# Patient Record
Sex: FEMALE | Race: WHITE | Employment: OTHER | URBAN - METROPOLITAN AREA
[De-identification: names, ages, dates, MRNs, and addresses within clinical notes are randomized per-mention and may not be internally consistent; named-entity substitution may affect disease eponyms.]

---

## 2018-02-01 ENCOUNTER — OFFICE VISIT (OUTPATIENT)
Dept: ORTHOPEDIC SURGERY | Age: 78
End: 2018-02-01

## 2018-02-01 VITALS
DIASTOLIC BLOOD PRESSURE: 79 MMHG | SYSTOLIC BLOOD PRESSURE: 125 MMHG | WEIGHT: 150 LBS | BODY MASS INDEX: 26.58 KG/M2 | HEIGHT: 63 IN

## 2018-02-01 DIAGNOSIS — M51.36 DDD (DEGENERATIVE DISC DISEASE), LUMBAR: Primary | ICD-10-CM

## 2018-02-01 DIAGNOSIS — M54.50 PAIN OF LUMBAR SPINE: ICD-10-CM

## 2018-02-01 DIAGNOSIS — M54.16 LUMBAR RADICULOPATHY: ICD-10-CM

## 2018-02-01 DIAGNOSIS — M47.816 SPONDYLOSIS OF LUMBAR REGION WITHOUT MYELOPATHY OR RADICULOPATHY: ICD-10-CM

## 2018-02-01 PROCEDURE — G8400 PT W/DXA NO RESULTS DOC: HCPCS | Performed by: PHYSICAL MEDICINE & REHABILITATION

## 2018-02-01 PROCEDURE — 1036F TOBACCO NON-USER: CPT | Performed by: PHYSICAL MEDICINE & REHABILITATION

## 2018-02-01 PROCEDURE — 99203 OFFICE O/P NEW LOW 30 MIN: CPT | Performed by: PHYSICAL MEDICINE & REHABILITATION

## 2018-02-01 PROCEDURE — 1123F ACP DISCUSS/DSCN MKR DOCD: CPT | Performed by: PHYSICAL MEDICINE & REHABILITATION

## 2018-02-01 PROCEDURE — 1090F PRES/ABSN URINE INCON ASSESS: CPT | Performed by: PHYSICAL MEDICINE & REHABILITATION

## 2018-02-01 PROCEDURE — 4040F PNEUMOC VAC/ADMIN/RCVD: CPT | Performed by: PHYSICAL MEDICINE & REHABILITATION

## 2018-02-01 PROCEDURE — G8484 FLU IMMUNIZE NO ADMIN: HCPCS | Performed by: PHYSICAL MEDICINE & REHABILITATION

## 2018-02-01 PROCEDURE — G8427 DOCREV CUR MEDS BY ELIG CLIN: HCPCS | Performed by: PHYSICAL MEDICINE & REHABILITATION

## 2018-02-01 PROCEDURE — G8419 CALC BMI OUT NRM PARAM NOF/U: HCPCS | Performed by: PHYSICAL MEDICINE & REHABILITATION

## 2018-02-01 NOTE — PROGRESS NOTES
115-21 MCG/ACT inhaler, Inhale 2 puffs into the lungs 2 times daily Indications: 500/50MCG , Disp: , Rfl:     albuterol (PROVENTIL HFA;VENTOLIN HFA) 108 (90 BASE) MCG/ACT inhaler, Inhale 2 puffs into the lungs every 6 hours as needed. , Disp: , Rfl:     metaxalone (SKELAXIN) 800 MG tablet, Take 800 mg by mouth 3 times daily as needed. , Disp: , Rfl:     oxcarbazepine (TRILEPTAL) 150 MG tablet, Take 150 mg by mouth. 450mg pm 150mg am, Disp: , Rfl:     fluticasone (FLONASE) 50 MCG/ACT nasal spray, 1 spray by Nasal route daily. , Disp: , Rfl:     therapeutic multivitamin-minerals (THERAGRAN-M) tablet, Take 1 tablet by mouth daily. , Disp: , Rfl:   Allergies:  Cephalexin; Lyrica [pregabalin]; Penicillins; Singulair [montelukast sodium]; and Sulfa antibiotics  Social History:    reports that she has never smoked. She has never used smokeless tobacco. She reports that she drinks alcohol. She reports that she does not use drugs. Family History:   History reviewed. No pertinent family history. REVIEW OF SYSTEMS: Full ROS noted & scanned          PHYSICAL EXAM:    Vitals: Blood pressure 125/79, height 5' 2.5\" (1.588 m), weight 150 lb (68 kg), not currently breastfeeding. GENERAL EXAM:  · General Apparence: Patient is adequately groomed with no evidence of malnutrition. · Psychiatric: Orientation: The patient is oriented to time, place and person. The patient's mood and affect are appropriate   · Vascular: Examination reveals no swelling and palpation reveals no tenderness in upper or lower extremities. Good capillary refill. Sensation is intact without deficit in the lower extremities to light touch and pinprick  · Coordination of the lower extremities are normal.    LUMBAR/SACRAL EXAMINATION:  · Inspection: Local inspection shows no step-off or bruising. Lumbar alignment is normal. No instability is noted. · Palpation:   No evidence of tenderness at the midline.   No tenderness bilaterally at the

## 2018-02-13 ENCOUNTER — HOSPITAL ENCOUNTER (OUTPATIENT)
Dept: PHYSICAL THERAPY | Age: 78
Discharge: HOME | End: 2018-02-28
Admitting: PHYSICAL MEDICINE & REHABILITATION

## 2018-02-22 ENCOUNTER — HOSPITAL ENCOUNTER (OUTPATIENT)
Dept: PHYSICAL THERAPY | Age: 78
Discharge: HOME | End: 2018-02-23
Admitting: PHYSICAL MEDICINE & REHABILITATION

## 2018-02-22 NOTE — FLOWSHEET NOTE
[x] EVAL (LOW) 01100 (typically 20 minutes face-to-face)  [] EVAL (MOD) 06649 (typically 30 minutes face-to-face)  [] EVAL (HIGH) 55979 (typically 45 minutes face-to-face)  [] RE-EVAL     [x] UV(00181) x      [] IONTO  [] NMR (57262) x      [] VASO  [] Manual (18615) x       [] Other:  [] TA x       [] Mech Traction (37722)  [] ES(attended) (84049)      [] ES (un) (87786):     Goals:   Short Term Goals: To be achieved in: 2 weeks  1. Independent in HEP and progression per patient tolerance, in order to prevent re-injury. 2. Patient will have a decrease in pain to facilitate improvement in movement, function, and ADLs as indicated by Functional Deficits. Long Term Goals: To be achieved in: 5 weeks  1. Disability index score of 20% or less for the RENA to assist with reaching prior level of function. 2. Patient will demonstrate increased AROM to WNL, good LS mobility, good hip ROM to allow for proper joint functioning as indicated by patients Functional Deficits. 3. Patient will demonstrate an increase in Strength to good proximal hip and core activation to allow for proper functional mobility as indicated by patients Functional Deficits. 4. Patient will return to walking for 15 minutes, functional activities without increased symptoms or restriction. Progression Towards Functional goals:  [] Patient is progressing as expected towards functional goals listed. [] Progression is slowed due to complexities listed. [] Progression has been slowed due to co-morbidities.   [x] Plan just implemented, too soon to assess goals progression  [] Other:     ASSESSMENT:  See eval    Treatment/Activity Tolerance:  [x] Patient tolerated treatment well [] Patient limited by fatique  [] Patient limited by pain  [] Patient limited by other medical complications  [] Other:     Prognosis: [] Good [x] Fair  [] Poor    Patient Requires Follow-up: [x] Yes  [] No    PLAN FOR NEXT SESSION:     PLAN: See eval  [] Continue

## 2018-02-22 NOTE — PLAN OF CARE
restricted    ASSESSMENT:   Functional Impairments:     [x]Noted lumbar/proximal hip hypomobility   []Noted lumbosacral and/or generalized hypermobility   [x]Decreased Lumbosacral/hip/LE functional ROM   [x]Decreased core/proximal hip strength and neuromuscular control    [x]Decreased LE functional strength    [x]Abnormal reflexes/sensation/myotomal/dermatomal deficits  []Reduced balance/proprioceptive control    []other:      Functional Activity Limitations (from functional questionnaire and intake)   [x]Reduced ability to tolerate prolonged functional positions   []Reduced ability or difficulty with changes of positions or transfers between positions   [x]Reduced ability to maintain good posture and demonstrate good body mechanics with sitting, bending, and lifting   [x]Reduced ability to sleep   [] Reduced ability or tolerance with driving and/or computer work   [x]Reduced ability to perform lifting, reaching, carrying tasks   [x]Reduced ability to squat   [x]Reduced ability to forward bend   [x]Reduced ability to ambulate prolonged functional periods/distances/surfaces   [x]Reduced ability to ascend/descend stairs   []other:       Participation Restrictions   [x]Reduced participation in self care activities   [x]Reduced participation in home management activities   []Reduced participation in work activities   [x]Reduced participation in social activities. []Reduced participation in sport/recreation activities. Classification:   []Signs/symptoms consistent with Lumbar instability/stabilization subgroup. []Signs/symptoms consistent with Lumbar mobilization/manipulation subgroup, myotomes and dermatomes intact. Meets manipulation criteria.     []Signs/symptoms consistent with Lumbar direction specific/centralization subgroup   []Signs/symptoms consistent with Lumbar traction subgroup     []Signs/symptoms consistent with lumbar facet dysfunction   [x]Signs/symptoms consistent with lumbar stenosis type proprioception for the proximal upper extremity and deep neck flexors. 1 Therapeutic exercise including: strength training, ROM/flexibility, NMR and proprioception for the core, hips and bilateral lower extremities. 2. Manual therapy as indicated including Dry Needling/IASTM, STM, PROM, Gr I-IV mobilizations, spinal mobilization/manipulation. 3. Modalities as needed including: thermal agents, E-stim, US, iontophoresis as indicated. 4. Patient education on spine protection, activity modification, progression of HEP. HEP instruction: Can be found in media file. (see scanned forms)    GOALS:  Patient stated goal: walk without pain      Therapist goals for Patient:Lumbar   Short Term Goals: To be achieved in: 2 weeks  1. Independent in HEP and progression per patient tolerance, in order to prevent re-injury. 2. Patient will have a decrease in pain to facilitate improvement in movement, function, and ADLs as indicated by Functional Deficits. Long Term Goals: To be achieved in: 5 weeks  1. Disability index score of 20% or less for the RENA to assist with reaching prior level of function. 2. Patient will demonstrate increased AROM to WNL, good LS mobility, good hip ROM to allow for proper joint functioning as indicated by patients Functional Deficits. 3. Patient will demonstrate an increase in Strength to good proximal hip and core activation to allow for proper functional mobility as indicated by patients Functional Deficits. 4. Patient will return to walking for 15 minutes, functional activities without increased symptoms or restriction.            Electronically signed by:  Haven Toure, [id number] [facility]

## 2018-03-01 ENCOUNTER — HOSPITAL ENCOUNTER (OUTPATIENT)
Dept: PHYSICAL THERAPY | Age: 78
Discharge: HOME | End: 2018-03-22
Attending: PHYSICAL MEDICINE & REHABILITATION | Admitting: PHYSICAL MEDICINE & REHABILITATION

## 2018-03-02 ENCOUNTER — HOSPITAL ENCOUNTER (OUTPATIENT)
Dept: PHYSICAL THERAPY | Age: 78
Discharge: HOME | End: 2018-03-03
Admitting: PHYSICAL MEDICINE & REHABILITATION

## 2018-03-02 NOTE — FLOWSHEET NOTE
Quadruped UE      Quadruped LE      Quadruped UE/LE combined (birddog)       TA / Sharlon  / Abd Hollow 15x5''     TA March      Bridge 15x3''     SLR Flex      SLR Abd 15x B     Clamshells      Prone plank      Side plank      Squats      Standing Stretch (insert muscle)                Therapeutic Exercise and NMR EXR  [x] (65144) Provided verbal/tactile cueing for activities related to strengthening, flexibility, endurance, ROM  for improvements in proximal hip and core control with self care, mobility, lifting and ambulation.  [] (36011) Provided verbal/tactile cueing for activities related to improving balance, coordination, kinesthetic sense, posture, motor skill, proprioception  to assist with core control in self care, mobility, lifting, and ambulation.      Therapeutic Activities:    [] (31981 or 63599) Provided verbal/tactile cueing for activities related to improving balance, coordination, kinesthetic sense, posture, motor skill, proprioception and motor activation to allow for proper function  with self care and ADLs  [] (90864) Provided training and instruction to the patient for proper core and proximal hip recruitment and positioning with ambulation re-education     Home Exercise Program:    [x] (19618) Reviewed/Progressed HEP activities related to strengthening, flexibility, endurance, ROM of core, proximal hip and LE for functional self-care, mobility, lifting and ambulation   [] (20917) Reviewed/Progressed HEP activities related to improving balance, coordination, kinesthetic sense, posture, motor skill, proprioception of core, proximal hip and LE for self care, mobility, lifting, and ambulation      Manual Treatments: Traction / PA's (Gr I, II, III, IV) / Stretch- Hamstring, Piriformis, Hip Flexor, Groin / STM/ Sacral Decompression  [x] Provided manual therapy to mobilize soft tissue/joints for the purpose of modulating pain, promoting relaxation, increasing ROM, reducing/eliminating soft tissue swelling/inflammation/restriction, improving soft tissue extensibility and allowing for proper ROM for normal function. (72871). Modalities:   Cp 10'    Charges:  Timed Code Treatment Minutes: 30   Total Treatment Minutes: 40     [] EVAL (LOW) 00877 (typically 20 minutes face-to-face)  [] EVAL (MOD) 79814 (typically 30 minutes face-to-face)  [] EVAL (HIGH) 13037 (typically 45 minutes face-to-face)  [] RE-EVAL     [x] LT(53365) x  1   [] IONTO  [] NMR (50100) x      [] VASO  [x] Manual (25196) x  1    [] Other:  [] TA x       [] Mech Traction (88703)  [] ES(attended) (56251)      [] ES (un) (81846):     Goals:   Short Term Goals: To be achieved in: 2 weeks  1. Independent in HEP and progression per patient tolerance, in order to prevent re-injury. 2. Patient will have a decrease in pain to facilitate improvement in movement, function, and ADLs as indicated by Functional Deficits. Long Term Goals: To be achieved in: 5 weeks  1. Disability index score of 20% or less for the RENA to assist with reaching prior level of function. 2. Patient will demonstrate increased AROM to WNL, good LS mobility, good hip ROM to allow for proper joint functioning as indicated by patients Functional Deficits. 3. Patient will demonstrate an increase in Strength to good proximal hip and core activation to allow for proper functional mobility as indicated by patients Functional Deficits. 4. Patient will return to walking for 15 minutes, functional activities without increased symptoms or restriction. Progression Towards Functional goals:  [] Patient is progressing as expected towards functional goals listed. [] Progression is slowed due to complexities listed. [] Progression has been slowed due to co-morbidities.   [x] Plan just implemented, too soon to assess goals progression  [] Other:     ASSESSMENT:  See eval    Treatment/Activity Tolerance:  [x] Patient tolerated treatment well [] Patient limited by sabiha  []

## 2018-03-05 ENCOUNTER — HOSPITAL ENCOUNTER (OUTPATIENT)
Dept: PHYSICAL THERAPY | Age: 78
Discharge: HOME | End: 2018-03-06
Admitting: PHYSICAL MEDICINE & REHABILITATION

## 2018-03-08 ENCOUNTER — HOSPITAL ENCOUNTER (OUTPATIENT)
Dept: PHYSICAL THERAPY | Age: 78
Discharge: HOME | End: 2018-03-09
Admitting: PHYSICAL MEDICINE & REHABILITATION

## 2018-03-14 ENCOUNTER — OFFICE VISIT (OUTPATIENT)
Dept: ORTHOPEDIC SURGERY | Age: 78
End: 2018-03-14

## 2018-03-14 VITALS
HEIGHT: 63 IN | DIASTOLIC BLOOD PRESSURE: 79 MMHG | BODY MASS INDEX: 26.56 KG/M2 | WEIGHT: 149.91 LBS | SYSTOLIC BLOOD PRESSURE: 125 MMHG

## 2018-03-14 DIAGNOSIS — M54.16 LUMBAR RADICULOPATHY: ICD-10-CM

## 2018-03-14 DIAGNOSIS — M51.36 DDD (DEGENERATIVE DISC DISEASE), LUMBAR: Primary | ICD-10-CM

## 2018-03-14 PROCEDURE — G8419 CALC BMI OUT NRM PARAM NOF/U: HCPCS | Performed by: PHYSICAL MEDICINE & REHABILITATION

## 2018-03-14 PROCEDURE — 4040F PNEUMOC VAC/ADMIN/RCVD: CPT | Performed by: PHYSICAL MEDICINE & REHABILITATION

## 2018-03-14 PROCEDURE — 1123F ACP DISCUSS/DSCN MKR DOCD: CPT | Performed by: PHYSICAL MEDICINE & REHABILITATION

## 2018-03-14 PROCEDURE — 1090F PRES/ABSN URINE INCON ASSESS: CPT | Performed by: PHYSICAL MEDICINE & REHABILITATION

## 2018-03-14 PROCEDURE — G8484 FLU IMMUNIZE NO ADMIN: HCPCS | Performed by: PHYSICAL MEDICINE & REHABILITATION

## 2018-03-14 PROCEDURE — G8400 PT W/DXA NO RESULTS DOC: HCPCS | Performed by: PHYSICAL MEDICINE & REHABILITATION

## 2018-03-14 PROCEDURE — 99213 OFFICE O/P EST LOW 20 MIN: CPT | Performed by: PHYSICAL MEDICINE & REHABILITATION

## 2018-03-14 PROCEDURE — 1036F TOBACCO NON-USER: CPT | Performed by: PHYSICAL MEDICINE & REHABILITATION

## 2018-03-14 PROCEDURE — G8427 DOCREV CUR MEDS BY ELIG CLIN: HCPCS | Performed by: PHYSICAL MEDICINE & REHABILITATION

## 2018-03-14 NOTE — PROGRESS NOTES
is no gross instability. There are no rashes, ulcerations or lesions. Strength and tone are normal. No atrophy or abnormal movements are noted. · LEFT LOWER EXTREMITY:  Inspection/examination of the left lower extremity does not show any tenderness, deformity or injury. Range of motion is normal and pain-free. There is no gross instability. There are no rashes, ulcerations or lesions. Strength and tone are normal. No atrophy or abnormal movements are noted. Diagnostic Testing:    No new diagnostics  Results for orders placed or performed in visit on 08/26/13   Lipid panel   Result Value Ref Range    Cholesterol, Total 187 0 - 199 mg/dL    Triglycerides 166 (H) 0 - 149 mg/dL    HDL 60 40 - 60 mg/dL    LDL Calculated 94 0 - 99 mg/dL    VLDL Cholesterol Calculated 33 Not Established mg/dL     Impression:       1. DDD (degenerative disc disease), lumbar    2. Lumbar radiculopathy        Plan:  Clinical Course: Slight improvement in back and leg pain  1. Medications:  Continue anti-inflammatories with appropriate GI Precautions including to stop if develop dark tarry stools or GI upset and to take with food. 2. PT:  We discussed proceeding with an MRI of lumbar spine if symptoms persist after 4 weeks  3. Further studies:  No further studies. 4. Interventional:  no current role for interventions  5. Follow up:  4-6 weeks          Nicholas Melo MD, DEBI, [facility]  Board Certified in [address] [place] [address] Certified and Fellowship Trained in [facility] ([facility])             This dictation was performed with a verbal recognition program [facility]) and it was checked for errors. It is possible that there are still dictated errors within this office note. If so, please bring any errors to my attention for an addendum. All efforts were made to ensure that this office note is accurate.

## 2018-03-16 ENCOUNTER — HOSPITAL ENCOUNTER (OUTPATIENT)
Dept: PHYSICAL THERAPY | Age: 78
Discharge: HOME | End: 2018-03-17
Admitting: PHYSICAL MEDICINE & REHABILITATION

## 2018-03-21 ENCOUNTER — HOSPITAL ENCOUNTER (OUTPATIENT)
Dept: PHYSICAL THERAPY | Age: 78
Discharge: HOME | End: 2018-03-22
Admitting: PHYSICAL MEDICINE & REHABILITATION

## 2018-03-21 NOTE — FLOWSHEET NOTE
[facility] Orthopa[facility], [employer]    Physical Therapy Daily Treatment Note  Date:  3/21/2018    Patient Name:  Alno Hector    :  1940  MRN: [id number]  Restrictions/Precautions:    Medical/Treatment Diagnosis Information:  · Diagnosis: Lumbar DDD, spondylosis, radiculopathy   M51.36  · Treatment Diagnosis: PT practice patten: 4F,  low back pain  Insurance/Certification information:  PT Insurance Information: Medicare  Physician Information:  Referring Practitioner: Dr. Raz Ruiz of care signed (Y/N):     Date of Patient follow up with Physician:     G-Code (if applicable):  Donald Jama    Date G-Code Applied:  18  PT G-Codes  Functional Assessment Tool Used: Modified Oswestry  Score: 36%  Functional Limitation: Changing and maintaining body position  Changing and Maintaining Body Position Current Status (): At least 20 percent but less than 40 percent impaired, limited or restricted  Changing and Maintaining Body Position Goal Status (): At least 1 percent but less than 20 percent impaired, limited or restricted    Progress Note: []  Yes  []  No  Next due by: Visit #10      Latex Allergy:  [x]NO      []YES  Preferred Language for Healthcare:   [x]English       []other:    Visit # Insurance Allowable   6 Medicare      Pain level:  0/10     SUBJECTIVE:  Pt. Reports her back feels really good. OBJECTIVE:  Observation: Pt. Reports not being a 100% compliant with her HEP. = 3/2/18, 3/5/18     Radicular symptoms in LLE.   Test measurements:      RESTRICTIONS/PRECAUTIONS: Cancer, DM, HBP, OA, RA, depression    Exercises/Interventions: Rx 3/29/18, MD 3/14/18    Exercise/Equipment Sets/Reps Notes Last Progression   Hand Knee Rock      Prone Press Up      LTR 15 x 5\"     Knee-opp shoulder stretch 2x30'' B     SKC      DKC 3x30''     Prone Quad Stretch       Hamstring Stretch @ 90/90 2x30'' B     Quadruped UE      Quadruped LE      Quadruped UE/LE combined (birddog) TA / Johney Fuchs / Abd Hollow 15x5''     TA March      Bridge 20x3''     SLR Flex      SLR Abd 20x B     Clamshells - SL X 20 B     Prone plank      Side plank      Squats      Standing Stretch (insert muscle)                Therapeutic Exercise and NMR EXR  [x] (93541) Provided verbal/tactile cueing for activities related to strengthening, flexibility, endurance, ROM  for improvements in proximal hip and core control with self care, mobility, lifting and ambulation.  [] (31600) Provided verbal/tactile cueing for activities related to improving balance, coordination, kinesthetic sense, posture, motor skill, proprioception  to assist with core control in self care, mobility, lifting, and ambulation.      Therapeutic Activities:    [] (77872 or 60113) Provided verbal/tactile cueing for activities related to improving balance, coordination, kinesthetic sense, posture, motor skill, proprioception and motor activation to allow for proper function  with self care and ADLs  [] (59607) Provided training and instruction to the patient for proper core and proximal hip recruitment and positioning with ambulation re-education     Home Exercise Program:    [x] (40358) Reviewed/Progressed HEP activities related to strengthening, flexibility, endurance, ROM of core, proximal hip and LE for functional self-care, mobility, lifting and ambulation   [] (75937) Reviewed/Progressed HEP activities related to improving balance, coordination, kinesthetic sense, posture, motor skill, proprioception of core, proximal hip and LE for self care, mobility, lifting, and ambulation      Manual Treatments: Traction / PA's (Gr I, II, III, IV) / Stretch- Hamstring, Piriformis, Hip Flexor, Groin / STM/ Sacral Decompression  [x] Provided manual therapy to mobilize soft tissue/joints for the purpose of modulating pain, promoting relaxation, increasing ROM, reducing/eliminating soft tissue swelling/inflammation/restriction, improving soft tissue

## 2018-03-22 ENCOUNTER — HOSPITAL ENCOUNTER (OUTPATIENT)
Dept: PHYSICAL THERAPY | Age: 78
Discharge: HOME | End: 2018-03-23
Admitting: PHYSICAL MEDICINE & REHABILITATION

## 2018-03-22 NOTE — FLOWSHEET NOTE
Abd Hollow 15x5''     TA March      Bridge 20x3''     SLR Flex          Clamshells - SL X 20 B     Prone plank      Side plank      Squats      Standing Stretch (insert muscle)                Therapeutic Exercise and NMR EXR  [x] (04428) Provided verbal/tactile cueing for activities related to strengthening, flexibility, endurance, ROM  for improvements in proximal hip and core control with self care, mobility, lifting and ambulation.  [] (01252) Provided verbal/tactile cueing for activities related to improving balance, coordination, kinesthetic sense, posture, motor skill, proprioception  to assist with core control in self care, mobility, lifting, and ambulation.      Therapeutic Activities:    [] (84945 or 08318) Provided verbal/tactile cueing for activities related to improving balance, coordination, kinesthetic sense, posture, motor skill, proprioception and motor activation to allow for proper function  with self care and ADLs  [] (18370) Provided training and instruction to the patient for proper core and proximal hip recruitment and positioning with ambulation re-education     Home Exercise Program:    [x] (61689) Reviewed/Progressed HEP activities related to strengthening, flexibility, endurance, ROM of core, proximal hip and LE for functional self-care, mobility, lifting and ambulation   [] (50531) Reviewed/Progressed HEP activities related to improving balance, coordination, kinesthetic sense, posture, motor skill, proprioception of core, proximal hip and LE for self care, mobility, lifting, and ambulation      Manual Treatments: Traction / PA's (Gr I, II, III, IV) / Stretch- Hamstring, Piriformis, Hip Flexor, Groin / STM/ Sacral Decompression  [x] Provided manual therapy to mobilize soft tissue/joints for the purpose of modulating pain, promoting relaxation, increasing ROM, reducing/eliminating soft tissue swelling/inflammation/restriction, improving soft tissue extensibility and allowing for proper ROM limited by other medical complications  [] Other:     Prognosis: [] Good [x] Fair  [] Poor    Patient Requires Follow-up: [] Yes  [x] No    PLAN FOR NEXT SESSION:     PLAN:   [] Continue per plan of care [] Alter current plan (see comments)  [] Plan of care initiated [] Hold pending MD visit [x] Discharge    Electronically signed by: Tish Knowles, [address], [address]

## 2018-12-11 NOTE — PROGRESS NOTES
I have reviewed and agree to the content of the note created by the Physical Therapist Assistant.     Electronically signed by Haseeb Rausch [employer]

## 2019-07-10 ENCOUNTER — HOSPITAL ENCOUNTER (OUTPATIENT)
Dept: PHYSICAL THERAPY | Age: 79
Setting detail: THERAPIES SERIES
Discharge: HOME | End: 2019-07-10
Payer: MEDICARE

## 2019-07-10 PROCEDURE — 97161 PT EVAL LOW COMPLEX 20 MIN: CPT | Performed by: PHYSICAL THERAPIST

## 2019-07-10 PROCEDURE — 97110 THERAPEUTIC EXERCISES: CPT | Performed by: PHYSICAL THERAPIST

## 2019-07-10 NOTE — PLAN OF CARE
around (mobility)     Pain Scale: 5/10  Easing factors: rest, heat  Provocative factors: standing, walking, driving     Type: []Constant   [x]Intermittent  []Radiating []Localized []other:     Numbness/Tingling: none    Functional Limitations/Impairments: []Sitting [x]Standing [x]Walking    []Squatting/bending  [x]Stairs           []ADL's  []Transfers []Sports/Recreation []Other:     Occupation/School: retired    Living Status/Prior Level of Function: Independent with ADLs and IADLs  (insert highest prior level of function)    OBJECTIVE:     ROM LEFT RIGHT   HIP Flex     HIP Abd     HIP Ext     HIP IR     HIP ER     Knee ext     Knee Flex     Ankle PF 45 45   Ankle DF 0 5 from neutral   Ankle In 30 30   Ankle Ev 20 20   Strength  LEFT RIGHT   HIP Flexors     HIP Abductors 4 4-   HIP Ext 4 4   Hip ER     Knee EXT (quad) 4 4-   Knee Flex (HS)     Ankle DF 4+ 4   Ankle PF 4 4-   Ankle Inv 4+ 4   Ankle EV 4+ 4-        Circumference  Mid  7 cm       LE Dermatomes     LE myotomes       Single Leg Squat: n/a  Single Leg Stance: LOB bilaterally: L 3 seconds, R 2 seconds    Joint mobility:    []Normal    [x]Hypo   []Hyper    Palpation: Global tenderness throughout medial/lateral ankle and forefoot    Functional Mobility/Transfers: independent    Posture: bilateral pes planus, mild hallux valgus    Bandages/Dressings/Incisions: n/a    Gait: (include devices/WB status) increased hip ER, push off through medial foot    Orthopedic Special Tests: (-)Anterior drawer, Ivan Sprung                       [x] Patient history, allergies, meds reviewed. Medical chart reviewed. See intake form. Review Of Systems (ROS):  [x]Performed Review of systems (Integumentary, CardioPulmonary, Neurological) by intake and observation. Intake form has been scanned into medical record. Patient has been instructed to contact their primary care physician regarding ROS issues if not already being addressed at this time.         Co-morbidities/Complexities stated goal: walk pain free    The patient will demonstrate at least 25% but less than 35% impairment, limitation or restriction in:   - walking and moving around (mobility)     Therapist goals for Patient:   Short Term Goals: To be achieved in: 2 weeks  1. Independent in HEP and progression per patient tolerance, in order to prevent re-injury. 2. Patient will have a decrease in pain to facilitate improvement in movement, function, and ADLs as indicated by Functional Deficits. Long Term Goals: To be achieved in: 4 weeks  1. Disability index score of 30% or less for the LEFS to assist with reaching prior level of function. 2. Patient will demonstrate increased AROM to 5 ankle DF to allow for proper joint functioning as indicated by patients Functional Deficits. 3. Patient will demonstrate an increase in Strength to 5/5 ankle PF/ inv/ever to allow for proper functional mobility as indicated by patients Functional Deficits. 4. Patient will return to walking for 15 minutes without increased symptoms or restriction.         Electronically signed by:  Dayana Lopez, [id number] [facility]

## 2019-07-10 NOTE — FLOWSHEET NOTE
Continue per plan of care [] Alter current plan (see comments)  [x] Plan of care initiated [] Hold pending MD visit [] Discharge    *If patient does not return for further follow ups after this date. Please consider this as the patients discharge from physical therapy.        Electronically signed by: Abram Ayala PT [id number]

## 2019-07-24 ENCOUNTER — HOSPITAL ENCOUNTER (OUTPATIENT)
Dept: PHYSICAL THERAPY | Age: 79
Setting detail: THERAPIES SERIES
Discharge: HOME | End: 2019-07-24
Payer: MEDICARE

## 2019-07-24 ENCOUNTER — APPOINTMENT (OUTPATIENT)
Dept: PHYSICAL THERAPY | Age: 79
End: 2019-07-24
Payer: MEDICARE

## 2019-07-24 PROCEDURE — 97110 THERAPEUTIC EXERCISES: CPT | Performed by: SPECIALIST/TECHNOLOGIST

## 2019-07-24 PROCEDURE — 97112 NEUROMUSCULAR REEDUCATION: CPT | Performed by: SPECIALIST/TECHNOLOGIST

## 2019-07-24 NOTE — FLOWSHEET NOTE
[facility] [address] [place] [address] [facility]    [facility] Therapy Daily Treatment Note  Date:  2019    Patient Name:  Azalea Waterman    :  1940  MRN: [id number]  Restrictions/Precautions:    Medical/Treatment Diagnosis Information:  · Diagnosis: Ankle weakness (Right)  M25.9  · Treatment Diagnosis: PT treatment diagnosis:  ankle pain, weakness  Insurance/Certification information:   Medicare  Physician Information:  Referring Practitioner: Dr Demetria Rodrigez of care signed (Y/N):     Date of Patient follow up with Physician:     G-Code (if applicable):      Date G-Code Applied: The patient demonstrates at least 55% but less than 65% impairment, limitation or restriction in:   - walking and moving around (mobility)       Progress Note: [x]  Yes  []  No  Next due by: Visit #10       Latex Allergy:  [x]NO      []YES  Preferred Language for Healthcare:   [x]English       []other:    Visit # Insurance Allowable   2 Medicare     Auth Required   []  Yes    [] No    Visits Approved  Date Ranged-       Pain level:  0-4/10     SUBJECTIVE:  Pt. Reports her ankle doesn't feel like it will give out on me when she is walking. OBJECTIVE:    Observation:    Test measurements:      RESTRICTIONS/PRECAUTIONS: Hx of cancer, DM, HTN, OA, Hx of ankle fracture    Exercises/Interventions:      Script:  19  Exercise Sets/Reps Notes Last Progression   Gastroc Stretch 3x30'' belt    Heelslides       LLLD Wallslide      HS Stretch:  Tableside / 90-90      SAQ      LAQ      SLR Flex      SLR Abd       SLR Ext      SLR Add.       Clamshells      Bridges      HR standing 2x15     Ankle Theraband Light blue 2x15 each PF, Ever    BAPS      Bike      Elliptical      Standing Stretch:  (insert muscles)      PEP 2' New     Lateral Walk      Mini Squats X 15 New     Single Leg Stance Balance 5x20'' floor    Tandem balance 1' ea New     FSU  6' 2 x 10 New               Therapeutic Exercise and NMR EXR  [x] (39172) Provided verbal/tactile cueing for activities related to strengthening, flexibility, endurance, ROM for improvements in LE, proximal hip, and core control with self care, mobility, lifting, ambulation. [x] (12963) Provided verbal/tactile cueing for activities related to improving balance, coordination, kinesthetic sense, posture, motor skill, proprioception  to assist with LE, proximal hip, and core control in self care, mobility, lifting, ambulation and eccentric single leg control.      NMR and Therapeutic Activities:    [] (43484 or 77335) Provided verbal/tactile cueing for activities related to improving balance, coordination, kinesthetic sense, posture, motor skill, proprioception and motor activation to allow for proper function of core, proximal hip and LE with self care and ADLs  [] (10859) Gait Re-education- Provided training and instruction to the patient for proper LE, core and proximal hip recruitment and positioning and eccentric body weight control with ambulation re-education including up and down stairs     Home Exercise Program:    [x] (26396) Reviewed/Progressed HEP activities related to strengthening, flexibility, endurance, ROM of core, proximal hip and LE for functional self-care, mobility, lifting and ambulation/stair navigation   [] (37402)Reviewed/Progressed HEP activities related to improving balance, coordination, kinesthetic sense, posture, motor skill, proprioception of core, proximal hip and LE for self care, mobility, lifting, and ambulation/stair navigation      Manual Treatments:  PROM / Scar Mobs / STM/Rollerstick / Knee (Flex./Ext.)  Stretch: H.S. / ITB / Piriformis / Catie Leak / Groin / Hip Flexor   [] (13617) Provided manual therapy to mobilize LE, proximal hip and/or LS spine soft tissue/joints for the purpose of modulating pain, promoting relaxation,  increasing ROM, reducing/eliminating soft tissue swelling/inflammation/restriction, improving soft tissue

## 2019-07-24 NOTE — PROGRESS NOTES
I have reviewed and agree to the content of the note created by the Physical Therapist Assistant.     Electronically signed by Inna Baugh PT

## 2019-07-31 ENCOUNTER — HOSPITAL ENCOUNTER (OUTPATIENT)
Dept: PHYSICAL THERAPY | Age: 79
Setting detail: THERAPIES SERIES
Discharge: HOME | End: 2019-07-31
Payer: MEDICARE

## 2019-07-31 PROCEDURE — 97112 NEUROMUSCULAR REEDUCATION: CPT | Performed by: SPECIALIST/TECHNOLOGIST

## 2019-07-31 PROCEDURE — 97110 THERAPEUTIC EXERCISES: CPT | Performed by: SPECIALIST/TECHNOLOGIST

## 2019-07-31 NOTE — FLOWSHEET NOTE
Therapeutic Exercise and NMR EXR  [x] (51884) Provided verbal/tactile cueing for activities related to strengthening, flexibility, endurance, ROM for improvements in LE, proximal hip, and core control with self care, mobility, lifting, ambulation. [x] (87528) Provided verbal/tactile cueing for activities related to improving balance, coordination, kinesthetic sense, posture, motor skill, proprioception  to assist with LE, proximal hip, and core control in self care, mobility, lifting, ambulation and eccentric single leg control.      NMR and Therapeutic Activities:    [] (71990 or 12101) Provided verbal/tactile cueing for activities related to improving balance, coordination, kinesthetic sense, posture, motor skill, proprioception and motor activation to allow for proper function of core, proximal hip and LE with self care and ADLs  [] (61211) Gait Re-education- Provided training and instruction to the patient for proper LE, core and proximal hip recruitment and positioning and eccentric body weight control with ambulation re-education including up and down stairs     Home Exercise Program:    [x] (56319) Reviewed/Progressed HEP activities related to strengthening, flexibility, endurance, ROM of core, proximal hip and LE for functional self-care, mobility, lifting and ambulation/stair navigation   [] (65312)Reviewed/Progressed HEP activities related to improving balance, coordination, kinesthetic sense, posture, motor skill, proprioception of core, proximal hip and LE for self care, mobility, lifting, and ambulation/stair navigation      Manual Treatments:  PROM / Scar Mobs / STM/Rollerstick / Knee (Flex./Ext.)  Stretch: H.S. / ITB / Piriformis / Drena Severe / Groin / Hip Flexor   [] (06821) Provided manual therapy to mobilize LE, proximal hip and/or LS spine soft tissue/joints for the purpose of modulating pain, promoting relaxation,  increasing ROM, reducing/eliminating soft tissue swelling/inflammation/restriction, improving soft tissue extensibility and allowing for proper ROM for normal function with self care, mobility, lifting and ambulation. Modalities: CP 10'     Charges:  Timed Code Treatment Minutes: 40   Total Treatment Minutes: 48    Pt. Was in a hour time slot  [] EVAL (LOW) 40167 (typically 20 minutes face-to-face)  [] EVAL (MOD) 00168 (typically 30 minutes face-to-face)  [] EVAL (HIGH) 29290 (typically 45 minutes face-to-face)  [] RE-EVAL     [x] CG(62561) x  2   [] IONTO  [x] NMR (82756) x 1    [] VASO   [] Manual (03059) x      [] Other:  [] TA x      [] Mech Traction (81035)  [] ES(attended) (84696)      [] ES (un) (30295):     GOALS:   Short Term Goals: To be achieved in: 2 weeks  1. Independent in HEP and progression per patient tolerance, in order to prevent re-injury. 2. Patient will have a decrease in pain to facilitate improvement in movement, function, and ADLs as indicated by Functional Deficits. Long Term Goals: To be achieved in: 4 weeks  1. Disability index score of 30% or less for the LEFS to assist with reaching prior level of function. 2. Patient will demonstrate increased AROM to 5 ankle DF to allow for proper joint functioning as indicated by patients Functional Deficits. 3. Patient will demonstrate an increase in Strength to 5/5 ankle PF/ inv/ever to allow for proper functional mobility as indicated by patients Functional Deficits. 4. Patient will return to walking for 15 minutes without increased symptoms or restriction. Progression Towards Functional goals:  [] Patient is progressing as expected towards functional goals listed. [] Progression is slowed due to complexities listed. [] Progression has been slowed due to co-morbidities. [x] Plan just implemented, too soon to assess goals progression  [] Other:     ASSESSMENT:  Pt.'s ankle was fatigued at the end of tx.        Treatment/Activity Tolerance:  [x] Patient tolerated treatment

## 2019-08-07 ENCOUNTER — HOSPITAL ENCOUNTER (OUTPATIENT)
Dept: PHYSICAL THERAPY | Age: 79
Setting detail: THERAPIES SERIES
Discharge: HOME | End: 2019-08-07
Payer: MEDICARE

## 2019-08-07 PROCEDURE — 97110 THERAPEUTIC EXERCISES: CPT | Performed by: SPECIALIST/TECHNOLOGIST

## 2019-08-07 NOTE — PROGRESS NOTES
I have reviewed and agree to the content of the note created by the Physical Therapist Assistant.     Electronically signed by Gopi Aragon PT

## 2019-08-07 NOTE — DISCHARGE SUMMARY
Neuromuscular Re-education [] Cold/hotpack         []Iontophoresis  [x] Instruction in HEP      Other:  [] Manual Therapy                   []                       ?           []   Assessment:  [x] All Goals were achieved. [] The following goals were achieved (#'s):  [] The following goals were not achieved for the following reasons:  Functional/assessment of improvement as it relates to each goal: ROM, strength, and pain levels with ADL's have improved. Plan of Care:  [] Discharge from Therapy Services due to:    Reason for Discharge:   [] All goals achieved    [] Patient having surgery  [] Physician discontinued therapy  [] Insurance/Financial Limitations [] Patient did not return for follow up visits [] Home program/1 visit only   [] No subjective or objective improvement [] Plateaued   [] Patient was unable to adhere to the plan of care established at evaluation. [] Referred back to physician for re-evaluation and did not return.      [] Other:?       Electronically signed by: